# Patient Record
(demographics unavailable — no encounter records)

---

## 2025-01-24 NOTE — REASON FOR VISIT
[Follow-Up - Clinic] : a clinic follow-up of [Carotid Artery Stenosis] : carotid stenosis [Hypertension] : hypertension [Medication Management] : Medication management

## 2025-01-24 NOTE — DISCUSSION/SUMMARY
[FreeTextEntry1] : This is a 74 year old woman with a history of mild carotid artery disease, hypertension, hyperlipidemia, diabetes, and a longstanding history of palpitations who presents to the office for a follow up cardiac evaluation.  She is undergoing a work up for anemia.  She was started on an ACEI, but had cough and this was stopped.   I am adding olmesartan 5 QD to her regimen.  Her echocardiogram one year ago was unrevealing.  She will continue on amlodipine 10 QD.    Her EKG is normal.  She will follow in 3 months.  She knows to call the office with any issues.  [EKG obtained to assist in diagnosis and management of assessed problem(s)] : EKG obtained to assist in diagnosis and management of assessed problem(s)

## 2025-01-24 NOTE — HISTORY OF PRESENT ILLNESS
[FreeTextEntry1] : This is a 74 year old woman with a history of mild carotid artery disease, hypertension, hyperlipidemia, diabetes, and a longstanding history of palpitations who presents to the office for a follow up cardiac evaluation.  She is undergoing a work up for anemia.  She was started on an ACEI, but had cough and this was stopped.  Her degree of palpitations are at baseline.    She has occasional late day LE edema. Her blood pressure is a bit high today.    She denies exertional chest pain,  PND, orthopnea.  She takes her medications as prescribed, and seems to be tolerating her regimen.

## 2025-01-24 NOTE — PHYSICAL EXAM
[General Appearance - In No Acute Distress] : no acute distress [Normal Conjunctiva] : the conjunctiva exhibited no abnormalities [Normal Oral Mucosa] : normal oral mucosa [Normal Jugular Venous V Waves Present] : normal jugular venous V waves present [Respiration, Rhythm And Depth] : normal respiratory rhythm and effort [Exaggerated Use Of Accessory Muscles For Inspiration] : no accessory muscle use [Auscultation Breath Sounds / Voice Sounds] : lungs were clear to auscultation bilaterally [Bowel Sounds] : normal bowel sounds [Abdomen Soft] : soft [Abdomen Tenderness] : non-tender [Abnormal Walk] : normal gait [Gait - Sufficient For Exercise Testing] : the gait was sufficient for exercise testing [Nail Clubbing] : no clubbing of the fingernails [Cyanosis, Localized] : no localized cyanosis [Petechial Hemorrhages (___cm)] : no petechial hemorrhages [Nail Splinter Hemorrhages] : no splinter hemorrhages of the nails [Skin Color & Pigmentation] : normal skin color and pigmentation [] : no rash [No Venous Stasis] : no venous stasis [Oriented To Time, Place, And Person] : oriented to person, place, and time [Affect] : the affect was normal [Mood] : the mood was normal [No Anxiety] : not feeling anxious [Not Palpable] : not palpable [Normal Rate] : normal [Rhythm Regular] : regular [Normal S1] : normal S1 [Normal S2] : normal S2 [No Gallop] : no gallop heard [III] : a grade 3 [1+] : left 1+ [No Abnormalities] : the abdominal aorta was not enlarged and no bruit was heard [No Pitting Edema] : no pitting edema present [FreeTextEntry1] : No JVD [Right Carotid Bruit] : no bruit heard over the right carotid [Left Carotid Bruit] : no bruit heard over the left carotid

## 2025-01-24 NOTE — CARDIOLOGY SUMMARY
[___] : [unfilled] [No Ischemia] : no Ischemia [None] : normal LV function [Mild] : mild mitral regurgitation [de-identified] :  Sinus bradycardia.

## 2025-04-24 NOTE — DISCUSSION/SUMMARY
[FreeTextEntry1] : This is a 74 year old woman with a history of mild carotid artery disease, hypertension, hyperlipidemia, diabetes, and a longstanding history of palpitations who presents to the office for a follow up cardiac evaluation.  She never started the olmesartan as she is worried she is going to have an allergic reaction and wants to have people around.    I am adding olmesartan 5 QD to her regimen.  She states she will start it tomorrow.  Her echocardiogram one year ago was unrevealing.  She will continue on amlodipine 10 QD.    Her EKG is normal.  She will follow in 1 months.  She knows to call the office with any issues.  [EKG obtained to assist in diagnosis and management of assessed problem(s)] : EKG obtained to assist in diagnosis and management of assessed problem(s)

## 2025-04-24 NOTE — HISTORY OF PRESENT ILLNESS
[FreeTextEntry1] : This is a 74 year old woman with a history of mild carotid artery disease, hypertension, hyperlipidemia, diabetes, and a longstanding history of palpitations who presents to the office for a follow up cardiac evaluation.  She never started the olmesartan as she is worried she is going to have an allergic reaction and wants to have people around.    Her degree of palpitations are at baseline.    She has occasional late day LE edema. Her blood pressure is a bit high today.    She denies exertional chest pain,  PND, orthopnea.  She takes her medications as prescribed, and seems to be tolerating her regimen.

## 2025-04-24 NOTE — PHYSICAL EXAM
[General Appearance - In No Acute Distress] : no acute distress [Normal Conjunctiva] : the conjunctiva exhibited no abnormalities [Normal Oral Mucosa] : normal oral mucosa [Normal Jugular Venous V Waves Present] : normal jugular venous V waves present [FreeTextEntry1] : No JVD [Respiration, Rhythm And Depth] : normal respiratory rhythm and effort [Exaggerated Use Of Accessory Muscles For Inspiration] : no accessory muscle use [Auscultation Breath Sounds / Voice Sounds] : lungs were clear to auscultation bilaterally [Bowel Sounds] : normal bowel sounds [Abdomen Soft] : soft [Abdomen Tenderness] : non-tender [Abnormal Walk] : normal gait [Gait - Sufficient For Exercise Testing] : the gait was sufficient for exercise testing [Nail Clubbing] : no clubbing of the fingernails [Cyanosis, Localized] : no localized cyanosis [Petechial Hemorrhages (___cm)] : no petechial hemorrhages [Nail Splinter Hemorrhages] : no splinter hemorrhages of the nails [Skin Color & Pigmentation] : normal skin color and pigmentation [] : no rash [No Venous Stasis] : no venous stasis [Oriented To Time, Place, And Person] : oriented to person, place, and time [Affect] : the affect was normal [Mood] : the mood was normal [No Anxiety] : not feeling anxious [Not Palpable] : not palpable [Normal Rate] : normal [Rhythm Regular] : regular [Normal S1] : normal S1 [Normal S2] : normal S2 [No Gallop] : no gallop heard [III] : a grade 3 [Right Carotid Bruit] : no bruit heard over the right carotid [Left Carotid Bruit] : no bruit heard over the left carotid [1+] : left 1+ [No Abnormalities] : the abdominal aorta was not enlarged and no bruit was heard [No Pitting Edema] : no pitting edema present

## 2025-04-24 NOTE — CARDIOLOGY SUMMARY
[de-identified] : NSR [___] : [unfilled] [No Ischemia] : no Ischemia [None] : normal LV function [Mild] : mild mitral regurgitation

## 2025-05-27 NOTE — DISCUSSION/SUMMARY
[FreeTextEntry1] : This is a 74 year old woman with a history of mild carotid artery disease, hypertension, hyperlipidemia, diabetes, and a longstanding history of palpitations who presents to the office for a follow up cardiac evaluation.  Her BP is now controlled.  She will continue olmesartan 5 QD.   Her echocardiogram one year ago was unrevealing.  She will continue on amlodipine 10 QD.   She will continue atenolol at the current dose.   Her EKG is normal.  She will follow in 6 months.  She knows to call the office with any issues.  [EKG obtained to assist in diagnosis and management of assessed problem(s)] : EKG obtained to assist in diagnosis and management of assessed problem(s)

## 2025-05-27 NOTE — HISTORY OF PRESENT ILLNESS
[FreeTextEntry1] : This is a 74 year old woman with a history of mild carotid artery disease, hypertension, hyperlipidemia, diabetes, and a longstanding history of palpitations who presents to the office for a follow up cardiac evaluation.  She started olmesartan, and her BP is now controlled.   Her degree of palpitations are at baseline.    She has occasional late day LE edema.     She denies exertional chest pain,  PND, orthopnea.  She takes her medications as prescribed, and seems to be tolerating her regimen.

## 2025-05-27 NOTE — CARDIOLOGY SUMMARY
[___] : [unfilled] [No Ischemia] : no Ischemia [None] : normal LV function [Mild] : mild mitral regurgitation [de-identified] : Sinus bradycardia.